# Patient Record
Sex: FEMALE | Race: WHITE | NOT HISPANIC OR LATINO | URBAN - METROPOLITAN AREA
[De-identification: names, ages, dates, MRNs, and addresses within clinical notes are randomized per-mention and may not be internally consistent; named-entity substitution may affect disease eponyms.]

---

## 2018-12-14 ENCOUNTER — OFFICE VISIT (OUTPATIENT)
Dept: URBAN - METROPOLITAN AREA CLINIC 76 | Facility: CLINIC | Age: 83
End: 2018-12-14
Payer: MEDICARE

## 2018-12-14 DIAGNOSIS — H52.4 PRESBYOPIA: ICD-10-CM

## 2018-12-14 DIAGNOSIS — H35.30 UNSPECIFIED MACULAR DEGENERATION: Primary | ICD-10-CM

## 2018-12-14 PROCEDURE — 99214 OFFICE O/P EST MOD 30 MIN: CPT | Performed by: OPTOMETRIST

## 2018-12-14 ASSESSMENT — VISUAL ACUITY
OS: 20/20
OD: 20/20

## 2018-12-14 ASSESSMENT — INTRAOCULAR PRESSURE
OD: 17
OS: 16

## 2018-12-14 ASSESSMENT — KERATOMETRY
OS: 43.50
OD: 43.25

## 2018-12-14 NOTE — IMPRESSION/PLAN
Impression: Unspecified macular degeneration: H35.30. OU. Plan: Observe for now. Discussed diagnosis in detail with patient. Counseling given about the benefits and/or risks of the Age-Related Eye Disease Study (AREDS) formulation for preventing progression of age-related macular degeneration (AMD). Add lutein 20-30 mg, zeaxanthin 2-5mg per day. Will continue to observe condition and or symptoms. Call if [address] worsens. Dispensed and explained use of [employer] grid.

## 2020-01-14 ENCOUNTER — OFFICE VISIT (OUTPATIENT)
Dept: URBAN - METROPOLITAN AREA CLINIC 76 | Facility: CLINIC | Age: 85
End: 2020-01-14
Payer: MEDICARE

## 2020-01-14 PROCEDURE — 99214 OFFICE O/P EST MOD 30 MIN: CPT | Performed by: OPTOMETRIST

## 2020-01-14 ASSESSMENT — INTRAOCULAR PRESSURE
OS: 17
OD: 17

## 2020-01-14 NOTE — IMPRESSION/PLAN
Impression: Unspecified macular degeneration: H35.30. OU. Plan: Observe for now. Discussed diagnosis in detail with patient. Counseling given about the benefits and/or risks of the Age-Related Eye Disease Study (AREDS) formulation for preventing progression of age-related macular degeneration (AMD). Add lutein 20-30 mg, zeaxanthin 2-5mg per day. Will continue to observe condition and or symptoms. Call if [place] worsens. Dispensed and explained use of [employer] grid.